# Patient Record
Sex: MALE | Race: ASIAN | NOT HISPANIC OR LATINO | URBAN - METROPOLITAN AREA
[De-identification: names, ages, dates, MRNs, and addresses within clinical notes are randomized per-mention and may not be internally consistent; named-entity substitution may affect disease eponyms.]

---

## 2018-06-05 ENCOUNTER — EMERGENCY (EMERGENCY)
Facility: HOSPITAL | Age: 28
LOS: 1 days | Discharge: HOME | End: 2018-06-05
Attending: EMERGENCY MEDICINE | Admitting: EMERGENCY MEDICINE
Payer: MEDICAID

## 2018-06-05 VITALS
RESPIRATION RATE: 18 BRPM | SYSTOLIC BLOOD PRESSURE: 151 MMHG | HEART RATE: 93 BPM | TEMPERATURE: 98 F | OXYGEN SATURATION: 95 % | DIASTOLIC BLOOD PRESSURE: 103 MMHG

## 2018-06-05 PROCEDURE — 71046 X-RAY EXAM CHEST 2 VIEWS: CPT | Mod: 26

## 2018-06-05 PROCEDURE — 99284 EMERGENCY DEPT VISIT MOD MDM: CPT

## 2018-06-05 NOTE — ED ADULT TRIAGE NOTE - CHIEF COMPLAINT QUOTE
Pt who denies any pmhx presents with c/o cp and sob x 1 hour. Grandfather with a hx of htn but denies any other known FH of cardiac disease. +smoker, travels from [place] on 3/30/18

## 2018-06-05 NOTE — ED PROVIDER NOTE - DISCHARGE DATE
Research Consent Note:    Cardiac Fibrosis and Risk Prediction in Cancer Treatment-Related Cardiotoxicity    IRB # [id number]  PI:  Shakeel Perez MD pager:  [phone]  :  RAMOS Francisco pager: [phone]    Estimated dates of participation: 2 years    Consent form was reviewed with the patient. The purpose, risks, and benefits of study participation were discussed. The study purpose was reviewed with expected duration of participation, procedures along with any foreseeable risks or discomforts. It was discussed that study participation is voluntary and that refusal to participate will involve no penalty or decrease benefits to which the subject is otherwise entitled, and the subject may discontinue participation at any time without penalty or loss of benefits. Patient verbalized and understanding and was able to state what study participation involved. Patient signed the consent form prior to study participation and was given a copy of the consent form.     Consent version: 09.06.2016  Signed on 10.04.2017  HIPAA version: 06.01.2016 Signed on 10.04.2017      
05-Jun-2018

## 2018-06-05 NOTE — ED PROVIDER NOTE - OBJECTIVE STATEMENT
26 y/o M pt with no sig PMHx, arrives to the ED c/o a substernal CP and pleurisy for 2-3 hours. Pt notes positive cardiac FHx, with grandfather having an MI at an elderly age, and father with HTN. Sx not related with exertion. Perc.:-. Denies fever, cough, chest trauma, or any other complaints. No daily meds. NKDA.

## 2020-03-13 NOTE — ED PROVIDER NOTE - TIMING
Nursing Consultation Note  Patient: Angelito Oquendo May  YOB: 1950  Age: 78year old  Radiation Oncologist: Dr. Laura Funes  Referring Physician: Kelly Cohn  Diagnosis:No diagnosis found.   Consult Date: 3/13/2020      Chemotherapy: NO INHALE 2 PUFFS BY MOUTH EVERY 4 TO 6 HOURS AS NEEDED 8.5 g 11   • CARVEDILOL 25 MG Oral Tab TAKE 1 TABLET BY MOUTH TWICE DAILY WITH FOOD 180 tablet 3   •  MG Oral Cap TAKE ONE CAPSULE BY MOUTH TWICE DAILY AS NEEDED 60 capsule 11   • DOXEPIN HCL 10 M [facility])    • Other and unspecified hyperlipidemia    • Other ill-defined conditions(799.89)     CEA 8/12   • Scoliosis     Severe   • Shortness of breath    • Tobacco abuse    • Unspecified essential hypertension     SZ \"due to high blood pressure\", 2012 file    Other Topics      Concerns:         Service: Not Asked        Blood Transfusions: Not Asked        Caffeine Concern: Yes          Coffee, half/half        Occupational Exposure: Not Asked        Hobby Hazards: Not Asked        Sleep Concern yes  Communication tools:  None  Patient's ability to learn:  excellent  Readiness to learn:   Motivated  Learning preferences:  Discussion and Written  Barriers to learning:  None  Interventions to reduce barriers:  Consult  Visual aids:  yes  Hearing disa sudden onset/constant